# Patient Record
(demographics unavailable — no encounter records)

---

## 2025-01-07 NOTE — ADDENDUM
[facility] Scheduling calling stating pt mri would need to state with out contrast or with or with out contrast for arthrogram of knee. Stated usually they are not done with contrast and can not schedule until it is changed.     [facility] central scheduling can be reached at [phone] [FreeTextEntry1] : Patient's note was transcribed with the assistance of a medical scribe under the supervision of Dr. Christopher. I, Dr. Christopher, have reviewed the patient's chart and agree that it aligns with my medical decisions. Amanda Hawkins, our scribe, also served as a chaperone for physical examination purposes.

## 2025-01-07 NOTE — ASSESSMENT
[FreeTextEntry1] : JOSE LOTT is a 70-year-old female who presents for consultation for hematuria in setting of UTI.  No risk factors for urothelial malignancy  Discussed options with the patient including repeating a urinalysis to confirm resolution of hematuria and if in fact there is no more hematuria we can follow-up as needed.  Also gave the option for noninvasive testing with sonogram.  However patient would like the full workup with a cystoscopy to rule out any bladder tumors. We discussed that on the CT scan the pelvis is not visible due to the artifact from hip replacement, therefore this is reasonable  - UA, UCx today ucytol - f/u next week for cystoscopy, discussed this endoscopic procedure with the patient and the associated risks Future she is interested in management of WOODY. Considering seeing Dr. Ramirez Covarrubias or cont f/u here.

## 2025-01-07 NOTE — HISTORY OF PRESENT ILLNESS
[FreeTextEntry1] : JOSE LOTT is a 70-year-old female who presents for consultation for hematuria in setting of UTI.    Pt experienced dysuria and hematuria at the end of 11/2024 and 12/2024 which prompted ER visit. She was diagnosed with UTI after ER visit and was called back as Bactrim was resistant and was given Vantin instead.  Denies flank pain, gross hematuria, dysuria or associated symptoms.  Does report generalized symptoms of lower back pain  CT AP w/ IV Cont 12/01/2024 - images independently reviewed by me - On my read, I agree with the findings. No hydronephrosis or stones noted. Bilateral renal simple cysts, HU 6 for the largest RUP cyst, 4 cm. Difficult to see bladder to hip artifact IMPRESSION: Since October 8, 2024; Stable exam without evidence for acute intra-abdominal or pelvic pathology. Nonspecific enlarged left iliac 2.1 cm lymph node, unchanged. (KIDNEYS/URETERS: Symmetric renal enhancement without hydronephrosis bilaterally. Bilateral cysts and additional subcentimeter hypodensities too small to further characterize.)  Labs 12/05/2024 Cr 1.0 GFR 61 Ca 9.5 K 4.3  UA 12/01/2024 - 15 RBCs, 8 WBCs, epithelial cells present. Microhematuria.  UCx - >100,000 CFU/ml Proteus mirabilis  UA 12/04/2024 - nitrite positive, bacteriuria. UCx - normal   history: Denies previous kidney stones, recurrent UTIs. No instrumentation. Family History: denies  malignancies. Social History: former  from [place], her  is my patient.  No smoking history no radiation history

## 2025-01-14 NOTE — HISTORY OF PRESENT ILLNESS
[FreeTextEntry1] : JOSE LOTT is a 70-year-old female who presents for consultation for hematuria in setting of UTI.  No risk factors for urothelial malignancy  Pt presents today for a cystoscopy. Denies flank pain, gross hematuria, dysuria or associated symptoms.  Reports persistent intermittent stress urinary incontinence only occurs during cough sneeze  previously CT AP w/ IV Cont 12/01/2024 - images independently reviewed by me - On my read, I agree with the findings. No hydronephrosis or stones noted. Bilateral renal simple cysts, HU 6 for the largest RUP cyst, 4 cm. Difficult to see bladder to hip artifact IMPRESSION: Since October 8, 2024; Stable exam without evidence for acute intra-abdominal or pelvic pathology. Nonspecific enlarged left iliac 2.1 cm lymph node, unchanged. (KIDNEYS/URETERS: Symmetric renal enhancement without hydronephrosis bilaterally. Bilateral cysts and additional subcentimeter hypodensities too small to further characterize.)  Labs 12/05/2024 Cr 1.0 GFR 61 Ca 9.5 K 4.3  UA 12/01/2024 - 15 RBCs, 8 WBCs, epithelial cells present. Microhematuria.  UCx - >100,000 CFU/ml Proteus mirabilis  UA 12/04/2024 - nitrite positive, bacteriuria. UCx - normal   history: Denies previous kidney stones, recurrent UTIs. No instrumentation. Family History: denies  malignancies. Social History: former  from [place], her  is my patient.  No smoking history no radiation history

## 2025-01-14 NOTE — ASSESSMENT
[FreeTextEntry1] : JOSE LOTT is a 70-year-old female who presents for consultation for hematuria in setting of UTI.  No risk factors for urothelial malignancy Cystoscopy negative today.  Cystocele noted  Discussed stress urinary continence recommend Kegel exercises.  Pamphlet given If no improvement she will follow-up with urogynecology No intervention necessary for mild cystocele at this time - f/u with me PRN. Will follow-up earlier if pt has any worsening symptoms, hematuria etc.

## 2025-01-14 NOTE — ADDENDUM
[FreeTextEntry1] : Patient's note was transcribed with the assistance of a medical scribe under the supervision of Dr. Christopher. I, Dr. Christopher, have reviewed the patient's chart and agree that it aligns with my medical decisions. Amanda Hawkins, our scribe, also served as a chaperone for physical examination purposes.  The submitted E/M billing level for this visit reflects the total time spent on the day of the visit including face-to-face time spent with the patient, non-face-to-face review of medical records and relevant information, documentation, and asynchronous communication with the patient after a visit via phone, email, or patients EHR portal after the visit.  The medical records reviewed are either scanned into the chart or reviewed with the patient using a patients electronic medical records portal for patients with records not available to [facility] via electronic transmission platforms from other institutions and labs.  Time spend counseling and performing coordination of care was also included in determining the appropriate EM billing level.

## 2025-04-29 NOTE — HISTORY OF PRESENT ILLNESS
[FreeTextEntry1] : Patient is 70 years old para 2-0-5-2 last menstrual period age 55. She has history of total laparoscopic hysterectomy Patient denies pain or bleeding

## 2025-04-29 NOTE — PHYSICAL EXAM
[MA] : MA [FreeTextEntry2] : BUD [Appropriately responsive] : appropriately responsive [Alert] : alert [No Acute Distress] : no acute distress [No Lymphadenopathy] : no lymphadenopathy [Regular Rate Rhythm] : regular rate rhythm [No Murmurs] : no murmurs [Clear to Auscultation B/L] : clear to auscultation bilaterally [Soft] : soft [Non-tender] : non-tender [Non-distended] : non-distended [No HSM] : No HSM [No Lesions] : no lesions [No Mass] : no mass [Oriented x3] : oriented x3 [Examination Of The Breasts] : a normal appearance [No Masses] : no breast masses were palpable [Vulvitis] : vulvitis [Labia Majora] : normal [Labia Minora] : normal [Normal] : normal [Atrophy] : atrophy [Absent] : absent [Uterine Adnexae] : non-palpable